# Patient Record
Sex: FEMALE | Race: ASIAN
[De-identification: names, ages, dates, MRNs, and addresses within clinical notes are randomized per-mention and may not be internally consistent; named-entity substitution may affect disease eponyms.]

---

## 2017-04-24 ENCOUNTER — HOSPITAL ENCOUNTER (EMERGENCY)
Dept: HOSPITAL 74 - FER | Age: 62
Discharge: HOME | End: 2017-04-24
Payer: COMMERCIAL

## 2017-04-24 VITALS — HEART RATE: 86 BPM | DIASTOLIC BLOOD PRESSURE: 91 MMHG | SYSTOLIC BLOOD PRESSURE: 162 MMHG | TEMPERATURE: 97.7 F

## 2017-04-24 VITALS — BODY MASS INDEX: 19.3 KG/M2

## 2017-04-24 DIAGNOSIS — Z85.3: ICD-10-CM

## 2017-04-24 DIAGNOSIS — S22.31XA: Primary | ICD-10-CM
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
## 2017-04-24 DIAGNOSIS — F17.210: ICD-10-CM
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
## 2017-04-24 DIAGNOSIS — E07.9: ICD-10-CM
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
## 2017-04-24 NOTE — PDOC
History of Present Illness





- General


Chief Complaint: Pain, Acute


Stated Complaint: SLIPPED AND FELL IN SHOWER,PAIN TO RIGHT BACK


Time Seen by Provider: 04/24/17 05:30





- History of Present Illness


Initial Comments: 





04/24/17 05:31


slipped in shower while coloring her hair


c/o r lateral rib pain


hurts when she coughs


Timing/Duration: 4-6 hours


Severity: moderate


Modifying Factors: improves with: immobilization, movement


Associated Symptoms: reports: cough.  denies: fever/chills, headaches, 

shortness of breath





Past History





- Past Medical History


Allergies/Adverse Reactions: 


 Allergies











Allergy/AdvReac Type Severity Reaction Status Date / Time


 


Penicillins Allergy Intermediate Swelling Verified 04/24/17 05:28











Home Medications: 


Ambulatory Orders





Levothyroxine [Synthroid -] 75 mcg PO DAILY 04/24/17 


Losartan Potassium 50 mg PO DAILY 04/24/17 


Oxycodone HCl/Acetaminophen [Percocet 5-325 mg Tablet] 1 - 2 tab PO Q6H #24 tab 

MDD 8 tabs 04/24/17 








Anemia: No


Asthma: No


Cancer: Yes (BREAST CANCER LEFT)


Cardiac Disorders: No


CVA: No


COPD: No


CHF: No


Dementia: No


Diabetes: No


GI Disorders: No


 Disorders: No


HTN: No


Hypercholesterolemia: No


Liver Disease: No


Seizures: No


Thyroid Disease: Yes





- Surgical History


Abdominal Surgery: No


Appendectomy: No


Cardiac Surgery: No


Cholecystectomy: No


Lung Surgery: No


Neurologic Surgery: No


Orthopedic Surgery: No





- Family Disease History


Comment:: 





04/24/17 05:32


non contributory





- Immunization History


Immunization Up to Date: Yes





- Psycho/Social/Smoking Cessation Hx


Suicidal Ideation: No


Smoking Status: No


Smoking History: Current some day smoker


Have you smoked in the past 12 months: Yes


Number of Cigarettes Smoked Daily: 5


'Breaking Loose' booklet given: 08/06/12


Hx Alcohol Use: Yes (SOCIAL)


Drug/Substance Use Hx: No


Substance Use Type: None


Hx Substance Use Treatment: No





**Review of Systems





- Review of Systems


All Other Systems: Reviewed and Negative





*Physical Exam





- Physical Exam


General Appearance: Yes: Nourished, Appropriately Dressed


HEENT: positive: Normal Voice


Neck: negative: Tender


Respiratory/Chest: positive: Decreased Breath Sounds, Other (tender lateral, 

mid thoracic)


Cardiovascular: positive: Regular Rhythm


Gastrointestinal/Abdominal: negative: Tender


Lymphatic: negative: Adenopathy


Extremity: positive: Normal Capillary Refill, Normal Inspection


Integumentary: positive: Normal Color


Neurologic: positive: Fully Oriented





Medical Decision Making





- Medical Decision Making





04/24/17 05:33


rib contusion vs fx


image


analgesia





*DC/Admit/Observation/Transfer


Diagnosis at time of Disposition: 


Rib fracture


Qualifiers:


 Encounter type: initial encounter Rib fracture type: single rib Fracture type: 

closed Laterality: right Qualified Code(s): S22.31XA - Fracture of one rib, 

right side, initial encounter for closed fracture





- Discharge Dispostion


Disposition: HOME


Condition at time of disposition: Stable





- Prescriptions


Prescriptions: 


Oxycodone HCl/Acetaminophen [Percocet 5-325 mg Tablet] 1 - 2 tab PO Q6H #24 tab 

MDD 8 tabs





- Patient Instructions


Printed Discharge Instructions:  DI for Rib Fracture





- Post Discharge Activity


Work/School Note:  Back to Work

## 2017-04-26 ENCOUNTER — HOSPITAL ENCOUNTER (EMERGENCY)
Dept: HOSPITAL 74 - FER | Age: 62
Discharge: HOME | End: 2017-04-26
Payer: COMMERCIAL

## 2017-04-26 VITALS — HEART RATE: 82 BPM | DIASTOLIC BLOOD PRESSURE: 96 MMHG | SYSTOLIC BLOOD PRESSURE: 156 MMHG

## 2017-04-26 VITALS — BODY MASS INDEX: 19.3 KG/M2

## 2017-04-26 DIAGNOSIS — Z85.3: ICD-10-CM

## 2017-04-26 DIAGNOSIS — F17.210: ICD-10-CM

## 2017-04-26 DIAGNOSIS — S22.31XD: Primary | ICD-10-CM

## 2017-04-26 DIAGNOSIS — E07.9: ICD-10-CM

## 2017-04-26 NOTE — PDOC
History of Present Illness





- General


Chief Complaint: Pain, Acute


Stated Complaint: PAIN/RIGHT RIB FRACTURE


Time Seen by Provider: 04/26/17 00:11


History Source: Patient


Exam Limitations: No Limitations





- History of Present Illness


Initial Comments: 





04/26/17 00:11


This is a 61-year-old female who has a right sided rib fracture. Patient comes 

in complaining of pain. Patient was seen here and diagnosed. Patient was given 

Percocet. Patient said she took her Percocet and is still having significant 

in. Patient  discomfort and pain so she came back in . She denied any shortness 

of breath. Patient's pain is not different was yesterday. 





PAST MEDICAL HISTORY:  no significant history





PAST SURGICAL HISTORY:  no significant history





FAMILY HISTORY:  no pertinant history





SOCIAL HISTORY:  Pt lives with  family and is employed.





MEDICATIONS:  reviewed





ALLERGIES:  As per nursing notes





Review of Systems


General:  No fevers or chills, no weakness, no weight loss 


HEENT: No change in vision.  No sore throat,. No ear pain


CardioVascular:  No chest pain or shortness of breath


Respiratory:No cough, or wheezing. 


Gastrointestinal:  no nausea, vomitting, diarrhea or constipation,  No rectal 

bleeding


Genitourinary:  No dysuria, hematuria, or frequency


Musculoskeletal:  No joint or muscle pain or swelling


Neurologic: No headache, vertigo, dizziness or loss of consciousness


Psychiatric: nor depression 


Skin: No rashes or easy bruising


Endocrine: no increased thirst or abnormal weight change


Allergic: no skin or latex allergy


All other systems reviewed and normal








GENERAL: The patient is awake, alert, and fully oriented, in moderate distress


HEAD: Normal with no signs of trauma.


EYES: Pupils equal, round and reactive to light, extraocular movements intact, 

sclera anicteric, conjunctiva clear.


CHEST: Lungs are clear to auscultation bilateral. There is decreased breath 

sounds secondary to poor inspiratory effort secondary to the pain. Otherwise 

normal exam.


EXTREMITIES: Normal range of motion, no edema.


NEUROLOGICAL: Normal speech, normal gait.


PSYCH: Normal mood, normal affect.


SKIN: Warm, Dry, normal turgor, no rashes or lesions noted.





Assessment and plan: This is a 61-year-old female with rib fracture right sided 

who comes in complaining of increased pain. Patient was given Toradol and told 

she could take Naprosyn or ibuprofen in addition to the Percocet.





Past History





- Past Medical History


Allergies/Adverse Reactions: 


 Allergies











Allergy/AdvReac Type Severity Reaction Status Date / Time


 


Penicillins Allergy Intermediate Swelling Verified 04/26/17 00:08











Home Medications: 


Ambulatory Orders





Levothyroxine [Synthroid -] 75 mcg PO DAILY 04/24/17 


Losartan Potassium 50 mg PO DAILY 04/24/17 


Oxycodone HCl/Acetaminophen [Percocet 5-325 mg Tablet] 1 - 2 tab PO Q6H #24 tab 

MDD 8 tabs 04/24/17 








Anemia: No


Asthma: No


Cancer: Yes (BREAST CANCER LEFT)


Cardiac Disorders: No


CVA: No


COPD: No


CHF: No


Dementia: No


Diabetes: No


GI Disorders: No


 Disorders: No


HTN: No


Hypercholesterolemia: No


Liver Disease: No


Seizures: No


Thyroid Disease: Yes





- Surgical History


Abdominal Surgery: No


Appendectomy: No


Cardiac Surgery: No


Cholecystectomy: No


Lung Surgery: No


Neurologic Surgery: No


Orthopedic Surgery: No





- Immunization History


Immunization Up to Date: Yes





- Psycho/Social/Smoking Cessation Hx


Suicidal Ideation: No


Smoking Status: No


Smoking History: Current some day smoker


Have you smoked in the past 12 months: Yes


Number of Cigarettes Smoked Daily: 5


'Breaking Loose' booklet given: 08/06/12


Hx Alcohol Use: Yes (SOCIAL)


Drug/Substance Use Hx: No


Substance Use Type: None


Hx Substance Use Treatment: No





*DC/Admit/Observation/Transfer


Diagnosis at time of Disposition: 


Rib fracture


Qualifiers:


 Encounter type: subsequent encounter Rib fracture type: single rib Fracture 

type: closed Laterality: right Fracture healing: with routine healing Qualified 

Code(s): S22.31XD - Fracture of one rib, right side, subsequent encounter for 

fracture with routine healing





- Discharge Dispostion


Disposition: HOME


Condition at time of disposition: Stable


Admit: No





- Patient Instructions


Additional Instructions: 


In addition to the Percocet U can take ibuprofen 3 tablets 3 times a day with 

food or Aleve 2 tablets twice a day with food.





Return to the emergency department immediately with ANY new, persistent or 

worsening symptoms.





Continue any medications as previously prescribed by your physician.





You should follow up with your primary doctor as soon as possible regarding 

today's emergency department visit.


.


Please make sure your doctor reviews the results of your emergency evaluation.





Thank you for coming to the   Emergency Department today for your care. It was 

a pleasure to see you today. Please note that your evaluation is INCOMPLETE 

until you  follow-up with your doctor.

## 2017-10-25 ENCOUNTER — HOSPITAL ENCOUNTER (OUTPATIENT)
Dept: HOSPITAL 74 - FASU-ENDO | Age: 62
Discharge: HOME | End: 2017-10-25
Attending: INTERNAL MEDICINE
Payer: COMMERCIAL

## 2017-10-25 VITALS — HEART RATE: 71 BPM | SYSTOLIC BLOOD PRESSURE: 118 MMHG | TEMPERATURE: 97.9 F | DIASTOLIC BLOOD PRESSURE: 80 MMHG

## 2017-10-25 VITALS — BODY MASS INDEX: 19.3 KG/M2

## 2017-10-25 DIAGNOSIS — D12.2: ICD-10-CM

## 2017-10-25 DIAGNOSIS — K63.5: ICD-10-CM

## 2017-10-25 DIAGNOSIS — D12.5: ICD-10-CM

## 2017-10-25 DIAGNOSIS — Z86.010: Primary | ICD-10-CM

## 2017-10-25 PROCEDURE — 0DBL8ZX EXCISION OF TRANSVERSE COLON, VIA NATURAL OR ARTIFICIAL OPENING ENDOSCOPIC, DIAGNOSTIC: ICD-10-PCS | Performed by: INTERNAL MEDICINE

## 2017-10-25 PROCEDURE — 0DBK8ZX EXCISION OF ASCENDING COLON, VIA NATURAL OR ARTIFICIAL OPENING ENDOSCOPIC, DIAGNOSTIC: ICD-10-PCS | Performed by: INTERNAL MEDICINE

## 2017-10-25 PROCEDURE — 0DBM8ZX EXCISION OF DESCENDING COLON, VIA NATURAL OR ARTIFICIAL OPENING ENDOSCOPIC, DIAGNOSTIC: ICD-10-PCS | Performed by: INTERNAL MEDICINE

## 2017-10-25 PROCEDURE — 0DBN8ZX EXCISION OF SIGMOID COLON, VIA NATURAL OR ARTIFICIAL OPENING ENDOSCOPIC, DIAGNOSTIC: ICD-10-PCS | Performed by: INTERNAL MEDICINE

## 2017-10-27 NOTE — PATH
Surgical Pathology Report



Patient Name:  JOSSELIN GILES

Accession #:  [id number]

[facility]. Rec. #:  [id number]                                                        

   /Age/Gender:  1955 (Age: 62) / F

Account:  [id number]                                                          

             Location: [facility]-ENDOSCOPY

Taken:  10/25/2017

Received:  10/25/2017

Reported:  10/27/2017

Physicians:  Dustin Cardona M.D.

  



Specimen(s) Received

A: BX SIGMOID 

B: BX APPENDICEAL ORIFICE 

C: BX RIGHT COLON 

D: BX HEPATIC FLEXURE 

E: BX LEFT COLON 





Clinical History

Rule out colon cancer

Polyps







Final Diagnosis

A. SIGMOID, BIOPSY:

TUBULAR ADENOMA.



B. APPENDICEAL ORIFICE, BIOPSY:  

HYPERPLASTIC POLYP.



C. RIGHT COLON, BIOPSY:  

TUBULAR ADENOMA.



D. HEPATIC FLEXURE, BIOPSY:  

TUBULAR ADENOMA.



E. LEFT COLON, BIOPSY:  

TUBULAR ADENOMA.





***Electronically Signed***

Jazmine Phoenix M.D.





Gross Description

A.  Received in formalin, labeled "sigmoid" is a tan, irregular portion of soft

tissue measuring 0.2 cm. in greatest dimension. The specimen is submitted in

toto in one cassette.



B.  Received in formalin, labeled "appendiceal orifice" is a tan, polypoid

portion of soft tissue measuring 0.6 cm. in greatest dimension. The specimen is

submitted in toto in one cassette.



C.  Received in formalin, labeled "right colon" is a tan, irregular portion of

soft tissue measuring 0.2 cm. in greatest dimension. The specimen is submitted

in toto in one cassette.



D.  Received in formalin, labeled "hepatic flexure" is a tan, polypoid portion

of soft tissue measuring 0.6 cm. in greatest dimension. The specimen is

submitted in toto in one cassette.



E.  Received in formalin, labeled "left colon" is a tan, polypoid portion of

soft tissue measuring 0.7 cm. in greatest dimension. The specimen is submitted

in toto in one cassette.

DL/10/26/2017



saudi/10/26/2017

## 2018-01-22 ENCOUNTER — HOSPITAL ENCOUNTER (OUTPATIENT)
Dept: HOSPITAL 74 - FASU-ENDO | Age: 63
Discharge: HOME | End: 2018-01-22
Attending: INTERNAL MEDICINE
Payer: COMMERCIAL

## 2018-01-22 VITALS — BODY MASS INDEX: 19.3 KG/M2

## 2018-01-22 VITALS — SYSTOLIC BLOOD PRESSURE: 104 MMHG | HEART RATE: 61 BPM | DIASTOLIC BLOOD PRESSURE: 60 MMHG

## 2018-01-22 VITALS — TEMPERATURE: 97.7 F

## 2018-01-22 DIAGNOSIS — K63.5: Primary | ICD-10-CM

## 2018-01-22 PROCEDURE — 0DBB8ZX EXCISION OF ILEUM, VIA NATURAL OR ARTIFICIAL OPENING ENDOSCOPIC, DIAGNOSTIC: ICD-10-PCS | Performed by: INTERNAL MEDICINE

## 2018-01-25 NOTE — PATH
Surgical Pathology Report



Patient Name:  JOSSELIN GILES

Accession #:  

[facility]. Rec. #:  [id number]                                                        

   /Age/Gender:  1955 (Age: 62) / F

Account:  [id number]                                                          

             Location: [facility]-ENDOSCOPY

Taken:  2018

Received:  2018

Reported:  2018

Physicians:  Dustin Cardona M.D.

  



Specimen(s) Received

 BX TERMINAL ILEUM 





Clinical History

Preoperative diagnosis: History of polyps

Postoperative diagnosis: Polypoid lesion, polyp







Final Diagnosis

TERMINAL ILEUM, BIOPSY:

INFLAMMATORY/GRANULATION TISSUE POLYP.





***Electronically Signed***

Jazmine Phoenix M.D.





Gross Description

Received in formalin, labeled "terminal ileum" are 4 tan, irregular portions of

soft tissue ranging from 0.1-0.4 cm. in greatest dimension. The specimens are

submitted in toto in one cassette.

## 2019-01-03 PROBLEM — Z00.00 ENCOUNTER FOR PREVENTIVE HEALTH EXAMINATION: Status: ACTIVE | Noted: 2019-01-03

## 2019-01-04 ENCOUNTER — MEDICATION RENEWAL (OUTPATIENT)
Age: 64
End: 2019-01-04

## 2019-04-03 ENCOUNTER — RECORD ABSTRACTING (OUTPATIENT)
Age: 64
End: 2019-04-03

## 2019-04-03 DIAGNOSIS — F17.200 NICOTINE DEPENDENCE, UNSPECIFIED, UNCOMPLICATED: ICD-10-CM

## 2019-04-03 DIAGNOSIS — Z85.3 PERSONAL HISTORY OF MALIGNANT NEOPLASM OF BREAST: ICD-10-CM

## 2019-04-03 DIAGNOSIS — Z82.49 FAMILY HISTORY OF ISCHEMIC HEART DISEASE AND OTHER DISEASES OF THE CIRCULATORY SYSTEM: ICD-10-CM

## 2019-04-10 ENCOUNTER — MEDICATION RENEWAL (OUTPATIENT)
Age: 64
End: 2019-04-10

## 2019-04-14 ENCOUNTER — APPOINTMENT (OUTPATIENT)
Dept: FAMILY MEDICINE | Facility: CLINIC | Age: 64
End: 2019-04-14
Payer: COMMERCIAL

## 2019-04-14 VITALS
BODY MASS INDEX: 19.07 KG/M2 | HEIGHT: 61 IN | SYSTOLIC BLOOD PRESSURE: 121 MMHG | RESPIRATION RATE: 18 BRPM | HEART RATE: 85 BPM | DIASTOLIC BLOOD PRESSURE: 80 MMHG | OXYGEN SATURATION: 98 % | TEMPERATURE: 98.7 F | WEIGHT: 101 LBS

## 2019-04-14 PROCEDURE — 99214 OFFICE O/P EST MOD 30 MIN: CPT | Mod: 25

## 2019-04-14 PROCEDURE — 36415 COLL VENOUS BLD VENIPUNCTURE: CPT

## 2019-04-14 RX ORDER — OXYCODONE AND ACETAMINOPHEN 5; 325 MG/1; MG/1
5-325 TABLET ORAL
Qty: 10 | Refills: 0 | Status: COMPLETED | COMMUNITY
Start: 2019-03-25

## 2019-04-14 RX ORDER — CHROMIUM 200 MCG
TABLET ORAL
Refills: 0 | Status: ACTIVE | COMMUNITY

## 2019-04-14 RX ORDER — LEVOTHYROXINE SODIUM 137 UG/1
TABLET ORAL
Refills: 0 | Status: COMPLETED | COMMUNITY
End: 2019-04-14

## 2019-04-14 NOTE — HISTORY OF PRESENT ILLNESS
[FreeTextEntry1] : 6 month followup of chronic medical conditions, and prescription refills.\par \par  [de-identified] : Patient presents for followup of chronic medical conditions-hypertension, hypothyroidism, major depressive disorder, low vitamin D-and prescription refills. Feels well in general. No new symptoms. Tolerating all medications. Needs maintenance laboratory testing.

## 2019-04-14 NOTE — PHYSICAL EXAM
[No Acute Distress] : no acute distress [Well Developed] : well developed [Well Nourished] : well nourished [Well-Appearing] : well-appearing [Normal Sclera/Conjunctiva] : normal sclera/conjunctiva [EOMI] : extraocular movements intact [PERRL] : pupils equal round and reactive to light [Normal Outer Ear/Nose] : the outer ears and nose were normal in appearance [Normal Oropharynx] : the oropharynx was normal [No JVD] : no jugular venous distention [Supple] : supple [Thyroid Normal, No Nodules] : the thyroid was normal and there were no nodules present [No Lymphadenopathy] : no lymphadenopathy [No Respiratory Distress] : no respiratory distress  [Clear to Auscultation] : lungs were clear to auscultation bilaterally [No Accessory Muscle Use] : no accessory muscle use [Normal Rate] : normal rate  [Regular Rhythm] : with a regular rhythm [No Murmur] : no murmur heard [Normal S1, S2] : normal S1 and S2 [No Carotid Bruits] : no carotid bruits [No Varicosities] : no varicosities [No Abdominal Bruit] : a ~M bruit was not heard ~T in the abdomen [No Edema] : there was no peripheral edema [Pedal Pulses Present] : the pedal pulses are present [No Extremity Clubbing/Cyanosis] : no extremity clubbing/cyanosis [No Palpable Aorta] : no palpable aorta [Soft] : abdomen soft [Non Tender] : non-tender [Non-distended] : non-distended [No Masses] : no abdominal mass palpated [No HSM] : no HSM [Normal Bowel Sounds] : normal bowel sounds [Normal Anterior Cervical Nodes] : no anterior cervical lymphadenopathy [Normal Posterior Cervical Nodes] : no posterior cervical lymphadenopathy [No CVA Tenderness] : no CVA  tenderness [No Spinal Tenderness] : no spinal tenderness [No Joint Swelling] : no joint swelling [Grossly Normal Strength/Tone] : grossly normal strength/tone [No Rash] : no rash [Normal Gait] : normal gait [No Focal Deficits] : no focal deficits [Coordination Grossly Intact] : coordination grossly intact [Deep Tendon Reflexes (DTR)] : deep tendon reflexes were 2+ and symmetric [Normal Affect] : the affect was normal [Normal Insight/Judgement] : insight and judgment were intact [Normal Voice/Communication] : normal voice/communication [Speech Grossly Normal] : speech grossly normal [Memory Grossly Normal] : memory grossly normal [Alert and Oriented x3] : oriented to person, place, and time [Normal Mood] : the mood was normal

## 2019-04-18 LAB
25(OH)D3 SERPL-MCNC: 53 NG/ML
ALBUMIN SERPL ELPH-MCNC: 4.7 G/DL
ALP BLD-CCNC: 78 U/L
ALT SERPL-CCNC: 25 U/L
ANION GAP SERPL CALC-SCNC: 13 MMOL/L
AST SERPL-CCNC: 25 U/L
BILIRUB SERPL-MCNC: 0.3 MG/DL
BUN SERPL-MCNC: 15 MG/DL
CALCIUM SERPL-MCNC: 10 MG/DL
CHLORIDE SERPL-SCNC: 106 MMOL/L
CHOLEST SERPL-MCNC: 179 MG/DL
CHOLEST/HDLC SERPL: 1.7 RATIO
CO2 SERPL-SCNC: 24 MMOL/L
CREAT SERPL-MCNC: 0.65 MG/DL
ESTIMATED AVERAGE GLUCOSE: 108 MG/DL
GLUCOSE SERPL-MCNC: 94 MG/DL
HBA1C MFR BLD HPLC: 5.4 %
HDLC SERPL-MCNC: 104 MG/DL
LDLC SERPL CALC-MCNC: 59 MG/DL
POTASSIUM SERPL-SCNC: 4.1 MMOL/L
PROT SERPL-MCNC: 6.8 G/DL
SODIUM SERPL-SCNC: 143 MMOL/L
T4 FREE SERPL-MCNC: 1.5 NG/DL
TRIGL SERPL-MCNC: 78 MG/DL
TSH SERPL-ACNC: 2.36 UIU/ML

## 2019-04-24 ENCOUNTER — APPOINTMENT (OUTPATIENT)
Dept: PODIATRY | Facility: CLINIC | Age: 64
End: 2019-04-24

## 2019-08-19 ENCOUNTER — APPOINTMENT (OUTPATIENT)
Dept: FAMILY MEDICINE | Facility: CLINIC | Age: 64
End: 2019-08-19
Payer: COMMERCIAL

## 2019-08-19 VITALS
TEMPERATURE: 98.5 F | DIASTOLIC BLOOD PRESSURE: 78 MMHG | HEIGHT: 61 IN | BODY MASS INDEX: 19.63 KG/M2 | HEART RATE: 65 BPM | WEIGHT: 104 LBS | RESPIRATION RATE: 17 BRPM | SYSTOLIC BLOOD PRESSURE: 118 MMHG | OXYGEN SATURATION: 98 %

## 2019-08-19 PROCEDURE — 99214 OFFICE O/P EST MOD 30 MIN: CPT

## 2019-09-01 NOTE — HISTORY OF PRESENT ILLNESS
[FreeTextEntry1] : 6 month followup of chronic medical conditions, and prescription refills.\par \par  [de-identified] : Patient presents for followup of chronic medical conditions-hypertension, hypothyroidism, major depressive disorder, low vitamin D-and prescription refills. Stinging insect attack to left lower arm and hand yesterday. Significant redness, swelling, and itching. Has been applying ice and topical antihistamine cream. Symptoms are much improved. Otherwise, feels well in general. No new symptoms. Tolerating all medications. Will defer maintenance laboratory testing this office visit.

## 2019-09-01 NOTE — PHYSICAL EXAM
[No Acute Distress] : no acute distress [Well Nourished] : well nourished [Well Developed] : well developed [Well-Appearing] : well-appearing [Normal Voice/Communication] : normal voice/communication [Normal Sclera/Conjunctiva] : normal sclera/conjunctiva [PERRL] : pupils equal round and reactive to light [EOMI] : extraocular movements intact [Normal Outer Ear/Nose] : the outer ears and nose were normal in appearance [Normal Oropharynx] : the oropharynx was normal [No JVD] : no jugular venous distention [No Lymphadenopathy] : no lymphadenopathy [Supple] : supple [Thyroid Normal, No Nodules] : the thyroid was normal and there were no nodules present [No Respiratory Distress] : no respiratory distress  [No Accessory Muscle Use] : no accessory muscle use [Clear to Auscultation] : lungs were clear to auscultation bilaterally [Normal Rate] : normal rate  [Regular Rhythm] : with a regular rhythm [Normal S1, S2] : normal S1 and S2 [No Carotid Bruits] : no carotid bruits [No Murmur] : no murmur heard [No Varicosities] : no varicosities [No Abdominal Bruit] : a ~M bruit was not heard ~T in the abdomen [Pedal Pulses Present] : the pedal pulses are present [No Edema] : there was no peripheral edema [No Palpable Aorta] : no palpable aorta [No Extremity Clubbing/Cyanosis] : no extremity clubbing/cyanosis [Soft] : abdomen soft [Non Tender] : non-tender [Non-distended] : non-distended [No HSM] : no HSM [No Masses] : no abdominal mass palpated [Normal Bowel Sounds] : normal bowel sounds [Normal Posterior Cervical Nodes] : no posterior cervical lymphadenopathy [Normal Anterior Cervical Nodes] : no anterior cervical lymphadenopathy [No CVA Tenderness] : no CVA  tenderness [No Spinal Tenderness] : no spinal tenderness [No Joint Swelling] : no joint swelling [Grossly Normal Strength/Tone] : grossly normal strength/tone [No Rash] : no rash [Coordination Grossly Intact] : coordination grossly intact [No Focal Deficits] : no focal deficits [Normal Gait] : normal gait [Deep Tendon Reflexes (DTR)] : deep tendon reflexes were 2+ and symmetric [Normal Insight/Judgement] : insight and judgment were intact [Normal Affect] : the affect was normal [de-identified] : Multiple red, warm, and slightly raised patches over the left distal arm and hand

## 2019-12-12 ENCOUNTER — MEDICATION RENEWAL (OUTPATIENT)
Age: 64
End: 2019-12-12

## 2020-01-12 ENCOUNTER — APPOINTMENT (OUTPATIENT)
Dept: FAMILY MEDICINE | Facility: CLINIC | Age: 65
End: 2020-01-12
Payer: MEDICARE

## 2020-01-12 VITALS
OXYGEN SATURATION: 96 % | HEIGHT: 61 IN | HEART RATE: 66 BPM | BODY MASS INDEX: 19.26 KG/M2 | WEIGHT: 102 LBS | SYSTOLIC BLOOD PRESSURE: 156 MMHG | TEMPERATURE: 100.5 F | DIASTOLIC BLOOD PRESSURE: 92 MMHG | RESPIRATION RATE: 17 BRPM

## 2020-01-12 PROCEDURE — 99214 OFFICE O/P EST MOD 30 MIN: CPT | Mod: 25

## 2020-01-12 PROCEDURE — 36415 COLL VENOUS BLD VENIPUNCTURE: CPT

## 2020-01-13 RX ORDER — CIPROFLOXACIN HYDROCHLORIDE 500 MG/1
500 TABLET, FILM COATED ORAL
Qty: 20 | Refills: 0 | Status: COMPLETED | COMMUNITY
Start: 2019-09-26

## 2020-01-13 RX ORDER — NYSTATIN AND TRIAMCINOLONE ACETONIDE 100000; 1 MG/G; MG/G
100000-0.1 CREAM TOPICAL
Qty: 60 | Refills: 0 | Status: COMPLETED | COMMUNITY
Start: 2019-10-04

## 2020-01-13 NOTE — REVIEW OF SYSTEMS
[Negative] : Heme/Lymph [Fever] : fever [Chills] : chills [Fatigue] : fatigue [Diarrhea] : diarrhea [Muscle Pain] : muscle pain [Headache] : headache [Earache] : no earache [Sore Throat] : no sore throat [Postnasal Drip] : no postnasal drip [Chest Pain] : no chest pain [Wheezing] : no wheezing [Cough] : no cough [Abdominal Pain] : no abdominal pain [Nausea] : no nausea [Constipation] : no constipation [Vomiting] : no vomiting [Melena] : no melena [Dysuria] : no dysuria [Hematuria] : no hematuria [Skin Rash] : no skin rash [Itching] : no itching [Easy Bleeding] : no easy bleeding [Easy Bruising] : no easy bruising [Swollen Glands] : no swollen glands

## 2020-01-13 NOTE — PHYSICAL EXAM
[No Acute Distress] : no acute distress [Well Nourished] : well nourished [Well Developed] : well developed [Normal Voice/Communication] : normal voice/communication [Normal Sclera/Conjunctiva] : normal sclera/conjunctiva [PERRL] : pupils equal round and reactive to light [EOMI] : extraocular movements intact [Normal Outer Ear/Nose] : the outer ears and nose were normal in appearance [Normal Oropharynx] : the oropharynx was normal [Supple] : supple [No Lymphadenopathy] : no lymphadenopathy [No JVD] : no jugular venous distention [Thyroid Normal, No Nodules] : the thyroid was normal and there were no nodules present [No Accessory Muscle Use] : no accessory muscle use [No Respiratory Distress] : no respiratory distress  [Regular Rhythm] : with a regular rhythm [Clear to Auscultation] : lungs were clear to auscultation bilaterally [Normal Rate] : normal rate  [Normal S1, S2] : normal S1 and S2 [No Murmur] : no murmur heard [No Abdominal Bruit] : a ~M bruit was not heard ~T in the abdomen [Pedal Pulses Present] : the pedal pulses are present [No Varicosities] : no varicosities [No Carotid Bruits] : no carotid bruits [No Extremity Clubbing/Cyanosis] : no extremity clubbing/cyanosis [No Palpable Aorta] : no palpable aorta [No Edema] : there was no peripheral edema [Non-distended] : non-distended [Soft] : abdomen soft [Non Tender] : non-tender [No Masses] : no abdominal mass palpated [No HSM] : no HSM [Normal Bowel Sounds] : normal bowel sounds [Normal Posterior Cervical Nodes] : no posterior cervical lymphadenopathy [Normal Anterior Cervical Nodes] : no anterior cervical lymphadenopathy [No CVA Tenderness] : no CVA  tenderness [Grossly Normal Strength/Tone] : grossly normal strength/tone [No Joint Swelling] : no joint swelling [No Spinal Tenderness] : no spinal tenderness [No Rash] : no rash [No Focal Deficits] : no focal deficits [Coordination Grossly Intact] : coordination grossly intact [Normal Affect] : the affect was normal [Normal Insight/Judgement] : insight and judgment were intact [Normal Gait] : normal gait [Ill-Appearing] : ill-appearing [Normal TMs] : both tympanic membranes were normal [Normal Percussion] : the chest was normal to percussion [Normal Supraclavicular Nodes] : no supraclavicular lymphadenopathy [Normal Axillary Nodes] : no axillary lymphadenopathy [Normal Inguinal Nodes] : no inguinal lymphadenopathy [Normal Femoral Nodes] : no femoral lymphadenopathy [Speech Grossly Normal] : speech grossly normal [Alert and Oriented x3] : oriented to person, place, and time

## 2020-01-13 NOTE — HISTORY OF PRESENT ILLNESS
[FreeTextEntry1] : 6 month followup of chronic medical conditions, and prescription refills.\par \par  [de-identified] : Patient presents for followup of chronic medical conditions-hypertension, hypothyroidism, major depressive disorder, low vitamin D-and prescription refills. Has had watery brown diarrhea for the last 3 days with associated headache, body aches, fever, chills, and malaise. No nausea or vomiting. Otherwise, feels well in general. No other new symptoms. Tolerating all prescription medications. Needs maintenance laboratory testing, and serial CEA levels.

## 2020-02-19 LAB
25(OH)D3 SERPL-MCNC: 43.7 NG/ML
ALBUMIN SERPL ELPH-MCNC: 4.4 G/DL
ALP BLD-CCNC: 91 U/L
ALT SERPL-CCNC: 16 U/L
ANION GAP SERPL CALC-SCNC: 15 MMOL/L
AST SERPL-CCNC: 21 U/L
BILIRUB SERPL-MCNC: 0.3 MG/DL
BUN SERPL-MCNC: 13 MG/DL
CALCIUM SERPL-MCNC: 9.8 MG/DL
CEA SERPL-MCNC: 11.9 NG/ML
CHLORIDE SERPL-SCNC: 106 MMOL/L
CHOLEST SERPL-MCNC: 177 MG/DL
CHOLEST/HDLC SERPL: 2 RATIO
CO2 SERPL-SCNC: 22 MMOL/L
CREAT SERPL-MCNC: 0.61 MG/DL
ESTIMATED AVERAGE GLUCOSE: 111 MG/DL
GLUCOSE SERPL-MCNC: 101 MG/DL
HBA1C MFR BLD HPLC: 5.5 %
HDLC SERPL-MCNC: 90 MG/DL
LDLC SERPL CALC-MCNC: 61 MG/DL
POTASSIUM SERPL-SCNC: 4 MMOL/L
PROT SERPL-MCNC: 6.4 G/DL
SODIUM SERPL-SCNC: 143 MMOL/L
T4 FREE SERPL-MCNC: 1.2 NG/DL
TRIGL SERPL-MCNC: 132 MG/DL
TSH SERPL-ACNC: 4.35 UIU/ML

## 2020-07-20 ENCOUNTER — APPOINTMENT (OUTPATIENT)
Dept: FAMILY MEDICINE | Facility: CLINIC | Age: 65
End: 2020-07-20
Payer: MEDICARE

## 2020-07-20 VITALS
RESPIRATION RATE: 17 BRPM | SYSTOLIC BLOOD PRESSURE: 130 MMHG | WEIGHT: 102 LBS | TEMPERATURE: 98.1 F | HEART RATE: 60 BPM | DIASTOLIC BLOOD PRESSURE: 70 MMHG | HEIGHT: 61 IN | BODY MASS INDEX: 19.26 KG/M2 | OXYGEN SATURATION: 96 %

## 2020-07-20 DIAGNOSIS — E55.9 VITAMIN D DEFICIENCY, UNSPECIFIED: ICD-10-CM

## 2020-07-20 DIAGNOSIS — I10 ESSENTIAL (PRIMARY) HYPERTENSION: ICD-10-CM

## 2020-07-20 DIAGNOSIS — Z20.828 CONTACT WITH AND (SUSPECTED) EXPOSURE TO OTHER VIRAL COMMUNICABLE DISEASES: ICD-10-CM

## 2020-07-20 DIAGNOSIS — F32.9 MAJOR DEPRESSIVE DISORDER, SINGLE EPISODE, UNSPECIFIED: ICD-10-CM

## 2020-07-20 DIAGNOSIS — T63.483A: ICD-10-CM

## 2020-07-20 DIAGNOSIS — E78.49 OTHER HYPERLIPIDEMIA: ICD-10-CM

## 2020-07-20 DIAGNOSIS — R97.0 ELEVATED CARCINOEMBRYONIC ANTIGEN [CEA]: ICD-10-CM

## 2020-07-20 DIAGNOSIS — A08.39 OTHER VIRAL ENTERITIS: ICD-10-CM

## 2020-07-20 DIAGNOSIS — D49.9 NEOPLASM OF UNSPECIFIED BEHAVIOR OF UNSPECIFIED SITE: ICD-10-CM

## 2020-07-20 DIAGNOSIS — K51.419: ICD-10-CM

## 2020-07-20 DIAGNOSIS — Z87.39 PERSONAL HISTORY OF OTHER DISEASES OF THE MUSCULOSKELETAL SYSTEM AND CONNECTIVE TISSUE: ICD-10-CM

## 2020-07-20 DIAGNOSIS — E03.9 HYPOTHYROIDISM, UNSPECIFIED: ICD-10-CM

## 2020-07-20 DIAGNOSIS — R73.03 PREDIABETES.: ICD-10-CM

## 2020-07-20 PROCEDURE — 99214 OFFICE O/P EST MOD 30 MIN: CPT | Mod: CS,25

## 2020-07-20 PROCEDURE — 36415 COLL VENOUS BLD VENIPUNCTURE: CPT | Mod: CS

## 2020-07-22 PROBLEM — I10 ESSENTIAL (PRIMARY) HYPERTENSION: Status: ACTIVE | Noted: 2019-04-03

## 2020-07-22 PROBLEM — Z20.828 EXPOSURE TO COVID-19 VIRUS: Status: ACTIVE | Noted: 2020-07-20

## 2020-07-22 PROBLEM — R97.0 ELEVATED CEA: Status: ACTIVE | Noted: 2019-04-03

## 2020-07-22 PROBLEM — E03.9 ACQUIRED HYPOTHYROIDISM: Status: ACTIVE | Noted: 2019-01-04

## 2020-07-22 PROBLEM — E55.9 VITAMIN D DEFICIENCY: Status: ACTIVE | Noted: 2019-04-03

## 2020-07-22 PROBLEM — E78.49 OTHER HYPERLIPIDEMIA: Status: ACTIVE | Noted: 2019-04-14

## 2020-07-22 PROBLEM — A08.39 OTHER VIRAL ENTERITIS: Status: RESOLVED | Noted: 2020-01-13 | Resolved: 2020-07-22

## 2020-07-22 PROBLEM — R73.03 PRE-DIABETES: Status: ACTIVE | Noted: 2019-04-14

## 2020-07-22 PROBLEM — D49.9 TUMOR: Status: RESOLVED | Noted: 2019-04-03 | Resolved: 2020-07-22

## 2020-07-22 PROBLEM — Z87.39 HISTORY OF INFLAMMATION OF SACROILIAC JOINT: Status: RESOLVED | Noted: 2019-04-03 | Resolved: 2020-07-22

## 2020-07-22 PROBLEM — K51.419: Status: ACTIVE | Noted: 2019-04-03

## 2020-07-22 PROBLEM — T63.483A: Status: RESOLVED | Noted: 2019-08-19 | Resolved: 2020-07-22

## 2020-07-22 PROBLEM — F32.9 DEPRESSION: Status: ACTIVE | Noted: 2019-04-03

## 2020-07-22 LAB
25(OH)D3 SERPL-MCNC: 60.9 NG/ML
ALBUMIN SERPL ELPH-MCNC: 4.5 G/DL
ALP BLD-CCNC: 95 U/L
ALT SERPL-CCNC: 18 U/L
ANION GAP SERPL CALC-SCNC: 12 MMOL/L
AST SERPL-CCNC: 23 U/L
BILIRUB SERPL-MCNC: 0.2 MG/DL
BUN SERPL-MCNC: 14 MG/DL
CALCIUM SERPL-MCNC: 9.7 MG/DL
CEA SERPL-MCNC: 13.3 NG/ML
CHLORIDE SERPL-SCNC: 104 MMOL/L
CHOLEST SERPL-MCNC: 159 MG/DL
CHOLEST/HDLC SERPL: 1.9 RATIO
CO2 SERPL-SCNC: 25 MMOL/L
CREAT SERPL-MCNC: 0.69 MG/DL
ESTIMATED AVERAGE GLUCOSE: 105 MG/DL
GLUCOSE SERPL-MCNC: 91 MG/DL
HBA1C MFR BLD HPLC: 5.3 %
HDLC SERPL-MCNC: 85 MG/DL
LDLC SERPL CALC-MCNC: 47 MG/DL
POTASSIUM SERPL-SCNC: 4.6 MMOL/L
PROT SERPL-MCNC: 6.5 G/DL
SARS-COV-2 IGG SERPL IA-ACNC: <0.1 INDEX
SARS-COV-2 IGG SERPL QL IA: NEGATIVE
SODIUM SERPL-SCNC: 141 MMOL/L
T4 FREE SERPL-MCNC: 1.4 NG/DL
TRIGL SERPL-MCNC: 134 MG/DL
TSH SERPL-ACNC: 2.2 UIU/ML

## 2020-07-22 RX ORDER — TRIAMCINOLONE ACETONIDE 1 MG/G
0.1 CREAM TOPICAL
Qty: 1 | Refills: 0 | Status: COMPLETED | COMMUNITY
Start: 2019-08-19 | End: 2020-07-22

## 2020-07-22 RX ORDER — LOSARTAN POTASSIUM 50 MG/1
50 TABLET, FILM COATED ORAL DAILY
Qty: 90 | Refills: 0 | Status: ACTIVE | COMMUNITY
Start: 2018-09-13 | End: 1900-01-01

## 2020-07-22 RX ORDER — ESCITALOPRAM OXALATE 10 MG/1
10 TABLET ORAL DAILY
Qty: 90 | Refills: 0 | Status: ACTIVE | COMMUNITY
Start: 1900-01-01 | End: 1900-01-01

## 2020-07-22 RX ORDER — LEVOTHYROXINE SODIUM 75 UG/1
75 TABLET ORAL DAILY
Qty: 90 | Refills: 0 | Status: ACTIVE | COMMUNITY
Start: 2019-01-04 | End: 1900-01-01

## 2020-07-22 NOTE — HEALTH RISK ASSESSMENT
[4 or more  times a week (4 pts)] : 4 or more  times a week (4 points) [] : Yes [Yes] : Yes [Never (0 pts)] : Never (0 points) [No falls in past year] : Patient reported no falls in the past year [1 or 2 (0 pts)] : 1 or 2 (0 points) [0] : 1) Little interest or pleasure doing things: Not at all (0) [YearQuit] : 2018 [Audit-CScore] : 4 [[id number]] : 0

## 2020-07-22 NOTE — PHYSICAL EXAM
[No Acute Distress] : no acute distress [Normal Voice/Communication] : normal voice/communication [Well Nourished] : well nourished [Well Developed] : well developed [Normal Sclera/Conjunctiva] : normal sclera/conjunctiva [Ill-Appearing] : ill-appearing [Normal Outer Ear/Nose] : the outer ears and nose were normal in appearance [PERRL] : pupils equal round and reactive to light [EOMI] : extraocular movements intact [Normal TMs] : both tympanic membranes were normal [Normal Oropharynx] : the oropharynx was normal [No Lymphadenopathy] : no lymphadenopathy [No JVD] : no jugular venous distention [Supple] : supple [Thyroid Normal, No Nodules] : the thyroid was normal and there were no nodules present [No Respiratory Distress] : no respiratory distress  [Clear to Auscultation] : lungs were clear to auscultation bilaterally [No Accessory Muscle Use] : no accessory muscle use [Normal Rate] : normal rate  [Normal Percussion] : the chest was normal to percussion [Regular Rhythm] : with a regular rhythm [Normal S1, S2] : normal S1 and S2 [No Murmur] : no murmur heard [No Abdominal Bruit] : a ~M bruit was not heard ~T in the abdomen [No Carotid Bruits] : no carotid bruits [Pedal Pulses Present] : the pedal pulses are present [No Varicosities] : no varicosities [No Edema] : there was no peripheral edema [No Palpable Aorta] : no palpable aorta [No Extremity Clubbing/Cyanosis] : no extremity clubbing/cyanosis [Soft] : abdomen soft [Non Tender] : non-tender [Non-distended] : non-distended [No Masses] : no abdominal mass palpated [No HSM] : no HSM [Normal Bowel Sounds] : normal bowel sounds [Normal Supraclavicular Nodes] : no supraclavicular lymphadenopathy [Normal Axillary Nodes] : no axillary lymphadenopathy [Normal Posterior Cervical Nodes] : no posterior cervical lymphadenopathy [Normal Inguinal Nodes] : no inguinal lymphadenopathy [Normal Anterior Cervical Nodes] : no anterior cervical lymphadenopathy [Normal Femoral Nodes] : no femoral lymphadenopathy [No Spinal Tenderness] : no spinal tenderness [No CVA Tenderness] : no CVA  tenderness [No Joint Swelling] : no joint swelling [No Rash] : no rash [Grossly Normal Strength/Tone] : grossly normal strength/tone [Coordination Grossly Intact] : coordination grossly intact [No Focal Deficits] : no focal deficits [Speech Grossly Normal] : speech grossly normal [Normal Affect] : the affect was normal [Normal Gait] : normal gait [Alert and Oriented x3] : oriented to person, place, and time [Normal Insight/Judgement] : insight and judgment were intact

## 2020-07-30 ENCOUNTER — HOSPITAL ENCOUNTER (EMERGENCY)
Dept: HOSPITAL 74 - FER | Age: 65
End: 2020-07-30
Payer: COMMERCIAL

## 2020-07-30 VITALS — HEART RATE: 60 BPM | DIASTOLIC BLOOD PRESSURE: 64 MMHG | SYSTOLIC BLOOD PRESSURE: 149 MMHG

## 2020-07-30 VITALS — TEMPERATURE: 97.9 F

## 2020-07-30 VITALS — BODY MASS INDEX: 21.4 KG/M2

## 2020-07-30 DIAGNOSIS — R51: Primary | ICD-10-CM

## 2020-07-30 NOTE — PDOC
*Physical Exam





- Vital Signs


                                Last Vital Signs











Temp Pulse Resp BP Pulse Ox


 


 97.9 F   69   18   157/99   98 


 


 07/30/20 06:37  07/30/20 06:37  07/30/20 06:37  07/30/20 06:37  07/30/20 06:37














Medical Decision Making





- Medical Decision Making





07/30/20 08:39


pt signed out to me pending CT head for disequilbrium that happens only when 

awakening in the morning.  Ct head shows no acute pathology.  will discharge 

home.  Patient understands the necessity of outpatient work up.  





Discharge





- Discharge Information


Problems reviewed: Yes


Clinical Impression/Diagnosis: 


Headache


Qualifiers:


 Headache type: unspecified Headache chronicity pattern: acute headache 

Intractability: not intractable Qualified Code(s): R51 - Headache








- Admission


No





- Follow up/Referral


Referrals: 


Keenan Zavala MD [Staff Physician] - 





- Patient Discharge Instructions


Patient Printed Discharge Instructions:  DI for Headache


Additional Instructions: 


you came to the Ed for headache and unsteadiness when you wake up in the 

morning.  we did a cat scan, which didn't show any concerning findings.  You 

must follow up with a neurologist as an outpatient and will likely need 

additional imaging.  You should return to the ED for severe headache, severe 

unsteadiness on your feet, weakness on one side of your body or face, other new 

or worsening findings. 





- Post Discharge Activity

## 2020-07-30 NOTE — PDOC
History of Present Illness





- General


Chief Complaint: Lightheaded


Stated Complaint: "I have been walking to the R lately"


Time Seen by Provider: 07/30/20 06:41





- History of Present Illness


Initial Comments: 





07/30/20 06:51


This 64-year-old woman with a history of left-sided breast cancer,HTN and 

colonic polyps presents with few week history of drifting to the right side when

she awakens in the morning as well as intermittent generalized headaches.  She 

denies vertigo, lightheadedness, facial or extremity weakness, speech 

abnormalities or word recall difficulty.  She denies any nausea/vomiting during 

her headaches.  Although she has not fallen secondary to her balance 

abnormality, she did strike her right shoulder against a wall when she drifted 

against it.


She consulted her son who is a doctor regarding her symptoms and he suggested 

that she come to the ER for evaluation


Patient states that she is moving out of the area in 3 days





Medications  as noted below


Allergies: Penicillin





PMD: Dr. Cornelius





Past History





- Medical History


Allergies/Adverse Reactions: 


                                    Allergies











Allergy/AdvReac Type Severity Reaction Status Date / Time


 


Penicillins Allergy Intermediate Swelling Verified 07/30/20 06:46











Home Medications: 


Ambulatory Orders





Levothyroxine [Synthroid -] 75 mcg PO DAILY 04/24/17 


Losartan Potassium 50 mg PO DAILY 04/24/17 


Calcium Carbonate/Vitamin D3 [Calcium 500-Vit D3 400 Tablet] 1 each PO DAILY 

10/24/17 


Cholecalciferol (Vitamin D3) [Vitamin D3 -] 1,000 unit PO DAILY 10/24/17 


Multivitamin [One Daily] 1 each PO DAILY 10/24/17 


Escitalopram Oxalate [Lexapro -] 10 mg PO DAILY 01/18/18 








Anemia: No


Asthma: No


Cancer: Yes (BREAST CANCER LEFT)


Cardiac Disorders: No


CVA: No


COPD: No


CHF: No


Dementia: No


Diabetes: No


GI Disorders: No


 Disorders: No


HTN: Yes


Hypercholesterolemia: No


Liver Disease: No


Seizures: No


Thyroid Disease: Yes





- Surgical History


Abdominal Surgery: Yes (BOWEL RESSECTION)


Appendectomy: No


Cardiac Surgery: No


Cholecystectomy: No


Lung Surgery: No


Neurologic Surgery: No


Orthopedic Surgery: Yes (bunionectomy R foot)





- Immunization History


Immunization Up to Date: Yes





- Psycho-Social/Smoking History


Smoking Status: No


Smoking History: Never smoked


Have you smoked in the past 12 months: Yes


Number of Cigarettes Smoked Daily: 3


'Breaking Loose' booklet given: 08/06/12





- Substance Abuse Hx (Audit-C & DAST Scrn)


How often the patient has a drink containing alcohol: Never


Score: In Men: 4 or > Positive; In Women: 3 or > Positive: 0


Screen Result (Pos requires Nsg. Audit-10AR): Negative





**Review of Systems





- Review of Systems


Able to Perform ROS?: Yes


Comments:: 





 12 point review of systems is negative except for what is noted in the history 

of present illness











*Physical Exam





- Vital Signs


                                Last Vital Signs











Temp Pulse Resp BP Pulse Ox


 


 97.9 F   69   18   157/99   98 


 


 07/30/20 06:37  07/30/20 06:37  07/30/20 06:37  07/30/20 06:37  07/30/20 06:37














- Physical Exam





GENERAL: Adult female, alert and oriented x3 in no acute distress


HEAD: Normal with no signs of trauma.


EYES: PERRLA, pupils 2 mm equal and reactive to light EOMI, sclera anicteric, 

conjunctiva clear.


ENT: Ears normal, nares patent, oropharynx clear without exudates.  Moist mucous

membranes.


NECK: Normal range of motion, supple without lymphadenopathy, JVD, or masses.  

No bruits


LUNGS: Breath sounds equal, clear to auscultation bilaterally.  No wheezes, and 

no crackles.


HEART:Regular rate and rhythm, normal S1 and S2 without murmur, rub or gallop.


ABDOMEN:.normal bowel sounds  No guarding,tenderness or rebound.No masses No 

distention. 


EXTREMITIES: Normal range of motion, no edema.  No clubbing or cyanosis. No 

erythema, or tenderness.


NEUROLOGICAL: Cranial nerves II through XII grossly intact.  No abnormal 

nystagmus.  Normal gait.  No pronator drift.  Normal speech.  Motor 5/5 

throughout.


SKIN: Warm, Dry, normal turgor, no rashes or lesions noted.











Medical Decision Making





- Medical Decision Making





07/30/20 06:58


64-year-old woman with a history of HTN and breast cancer presents with few week

history of intermittent disequilibrium (drifts to the right when walking after 

she awakens) and new intermittent headaches.  No other acute symptoms.  Exam as 

noted . No neurologic abnormalities.





Patient informed that she would not be able to get an MRI today (which she had 

requested) but noncontrast head CT can be performed.


Patient understood and agreed to the plan.





Case signed out to incoming physician at end of shift.





Discharge





- Discharge Information


Problems reviewed: Yes


Clinical Impression/Diagnosis: 


Headache


Qualifiers:


 Headache type: unspecified Headache chronicity pattern: acute headache 

Intractability: not intractable Qualified Code(s): R51 - Headache








- Follow up/Referral


Referrals: 


Keenan Zavala MD [Staff Physician] - 





- Patient Discharge Instructions


Patient Printed Discharge Instructions:  DI for Headache


Additional Instructions: 


you came to the Ed for headache and unsteadiness when you wake up in the 

morning.  we did a cat scan, which didn't show any concerning findings.  You 

must follow up with a neurologist as an outpatient and will likely need 

additional imaging.  You should return to the ED for severe headache, severe 

unsteadiness on your feet, weakness on one side of your body or face, other new 

or worsening findings. 





- Post Discharge Activity